# Patient Record
Sex: FEMALE | Race: WHITE | NOT HISPANIC OR LATINO | Employment: FULL TIME | URBAN - METROPOLITAN AREA
[De-identification: names, ages, dates, MRNs, and addresses within clinical notes are randomized per-mention and may not be internally consistent; named-entity substitution may affect disease eponyms.]

---

## 2017-08-17 ENCOUNTER — OFFICE VISIT (OUTPATIENT)
Dept: NEUROLOGY | Facility: CLINIC | Age: 27
End: 2017-08-17
Payer: COMMERCIAL

## 2017-08-17 VITALS
HEIGHT: 63 IN | SYSTOLIC BLOOD PRESSURE: 110 MMHG | BODY MASS INDEX: 24.68 KG/M2 | DIASTOLIC BLOOD PRESSURE: 74 MMHG | WEIGHT: 139.31 LBS | HEART RATE: 92 BPM

## 2017-08-17 DIAGNOSIS — M54.2 CHRONIC NECK PAIN: ICD-10-CM

## 2017-08-17 DIAGNOSIS — G89.29 CHRONIC NECK PAIN: ICD-10-CM

## 2017-08-17 DIAGNOSIS — F07.81 POST CONCUSSION SYNDROME: Primary | ICD-10-CM

## 2017-08-17 PROCEDURE — 3008F BODY MASS INDEX DOCD: CPT | Mod: S$GLB,,, | Performed by: PSYCHIATRY & NEUROLOGY

## 2017-08-17 PROCEDURE — 99999 PR PBB SHADOW E&M-EST. PATIENT-LVL II: CPT | Mod: PBBFAC,,, | Performed by: PSYCHIATRY & NEUROLOGY

## 2017-08-17 PROCEDURE — 99214 OFFICE O/P EST MOD 30 MIN: CPT | Mod: S$GLB,,, | Performed by: PSYCHIATRY & NEUROLOGY

## 2017-08-17 NOTE — PROGRESS NOTES
Subjective:       Patient ID: Ilsa Juarez is a 27 y.o. female.    Reason for Consult: Concussion      Interval History:  Ilsa Juarez is here for follow up. Their condition has improved clinically.  She brings the documentation from her physical therapist.  Apparently they have worked on her vestibular rehabilitation and oculomotor issues as well as VOR in earnest.  She notes that she still has occasional chronic neck pain which is likely a result of her work-related injury from 7/16/16 although she notes it does not bother her.  She has simulated exercises and physical therapy in the light condition at height with ladders and has not had any problems at all.  She feels as if she is back to baseline on today's visit.  She notes she is no longer have any cognitive issues.  She did note that there was an episode of depression in April with anhedonia but she has not yet seen a mental health professional.     Objective:     Vitals:    08/17/17 1402   BP: 110/74   Pulse: 92     Patient is awake alert oriented to person place and time.  Moves all 4 tremors against gravity.  Gait and station within normal limits.  Cranial nerves II through XII without focal deficit.  DWIGHT is within normal limits, 3/3/2.  Point of convergence is less than 15 cm.  Focused examination was undertaken today. Over 50% of face to face time of 25 minute visit time was in giving guidance, counseling and discussing treatment options.    Assessment/Plan:     Problem List Items Addressed This Visit        Neuro    Post concussion syndrome - Primary    Overview     Concussion in 7/16/16. Has graduated from PT/OT          Current Assessment & Plan     As of 8/17/17 her concussion has resolved.             Orthopedic    Chronic neck pain    Overview     Related to work related concussion on 7/16/16           Other Visit Diagnoses    None.       27-year-old female presents for evaluation of concussion sustained in  July 2016.  At this point in time we both believe she has made a significant recovery and is at baseline with the exception of occasional neck pain.  At this time I do believe she can resume working without restrictions doing full duty for her employer.  I have written a note to this effect.  As she is back to baseline I will see her on an as-needed basis or if any other problems should cropped up.    The patient verbalizes understanding and agreement with the treatment plan. I have discussed risks, benefits and alternatives to the treatment plan. Questions were sought and answered to her stated verbal satisfaction.        Sonam Paris MD    This note is dictated on Dragon Natural Speaking word recognition program. There are word recognition mistakes that are occasionally missed on review.